# Patient Record
Sex: FEMALE
[De-identification: names, ages, dates, MRNs, and addresses within clinical notes are randomized per-mention and may not be internally consistent; named-entity substitution may affect disease eponyms.]

---

## 2018-08-31 ENCOUNTER — HOSPITAL ENCOUNTER (EMERGENCY)
Dept: HOSPITAL 75 - ER | Age: 28
Discharge: OTHER INSTITUTION | End: 2018-08-31
Payer: SELF-PAY

## 2018-08-31 VITALS — BODY MASS INDEX: 12.93 KG/M2 | WEIGHT: 73 LBS | HEIGHT: 63 IN

## 2018-08-31 VITALS — DIASTOLIC BLOOD PRESSURE: 74 MMHG | SYSTOLIC BLOOD PRESSURE: 107 MMHG

## 2018-08-31 DIAGNOSIS — Z91.14: ICD-10-CM

## 2018-08-31 DIAGNOSIS — E41: ICD-10-CM

## 2018-08-31 DIAGNOSIS — E11.65: Primary | ICD-10-CM

## 2018-08-31 LAB
ALBUMIN SERPL-MCNC: 4.5 GM/DL (ref 3.2–4.5)
ALP SERPL-CCNC: 82 U/L (ref 40–136)
ALT SERPL-CCNC: 11 U/L (ref 0–55)
APTT PPP: YELLOW S
BACTERIA #/AREA URNS HPF: (no result) /HPF
BASOPHILS # BLD AUTO: 0.1 10^3/UL (ref 0–0.1)
BASOPHILS NFR BLD AUTO: 1 % (ref 0–10)
BILIRUB SERPL-MCNC: 0.6 MG/DL (ref 0.1–1)
BILIRUB UR QL STRIP: NEGATIVE
BUN/CREAT SERPL: 15
CALCIUM SERPL-MCNC: 10 MG/DL (ref 8.5–10.1)
CHLORIDE SERPL-SCNC: 97 MMOL/L (ref 98–107)
CO2 SERPL-SCNC: 21 MMOL/L (ref 21–32)
CREAT SERPL-MCNC: 0.74 MG/DL (ref 0.6–1.3)
EOSINOPHIL # BLD AUTO: 0.1 10^3/UL (ref 0–0.3)
EOSINOPHIL NFR BLD AUTO: 1 % (ref 0–10)
ERYTHROCYTE [DISTWIDTH] IN BLOOD BY AUTOMATED COUNT: 12.8 % (ref 10–14.5)
FIBRINOGEN PPP-MCNC: CLEAR MG/DL
GFR SERPLBLD BASED ON 1.73 SQ M-ARVRAT: > 60 ML/MIN
GLUCOSE SERPL-MCNC: 360 MG/DL (ref 70–105)
GLUCOSE UR STRIP-MCNC: (no result) MG/DL
HCT VFR BLD CALC: 42 % (ref 35–52)
HGB BLD-MCNC: 15.2 G/DL (ref 11.5–16)
KETONES UR QL STRIP: (no result)
LEUKOCYTE ESTERASE UR QL STRIP: (no result)
LYMPHOCYTES # BLD AUTO: 2.8 X 10^3 (ref 1–4)
LYMPHOCYTES NFR BLD AUTO: 39 % (ref 12–44)
MANUAL DIFFERENTIAL PERFORMED BLD QL: NO
MCH RBC QN AUTO: 33 PG (ref 25–34)
MCHC RBC AUTO-ENTMCNC: 36 G/DL (ref 32–36)
MCV RBC AUTO: 90 FL (ref 80–99)
MONOCYTES # BLD AUTO: 0.6 X 10^3 (ref 0–1)
MONOCYTES NFR BLD AUTO: 9 % (ref 0–12)
NEUTROPHILS # BLD AUTO: 3.5 X 10^3 (ref 1.8–7.8)
NEUTROPHILS NFR BLD AUTO: 51 % (ref 42–75)
NITRITE UR QL STRIP: NEGATIVE
PH UR STRIP: 5 [PH] (ref 5–9)
PLATELET # BLD: 384 10^3/UL (ref 130–400)
PMV BLD AUTO: 11.2 FL (ref 7.4–10.4)
POTASSIUM SERPL-SCNC: 4.8 MMOL/L (ref 3.6–5)
PROT SERPL-MCNC: 7.9 GM/DL (ref 6.4–8.2)
PROT UR QL STRIP: NEGATIVE
RBC # BLD AUTO: 4.67 10^6/UL (ref 4.35–5.85)
RBC #/AREA URNS HPF: (no result) /HPF
SODIUM SERPL-SCNC: 136 MMOL/L (ref 135–145)
SP GR UR STRIP: 1.01 (ref 1.02–1.02)
SQUAMOUS #/AREA URNS HPF: (no result) /HPF
TSH SERPL DL<=0.05 MIU/L-ACNC: 1.74 UIU/ML (ref 0.35–4.94)
UROBILINOGEN UR-MCNC: NORMAL MG/DL
WBC # BLD AUTO: 7 10^3/UL (ref 4.3–11)
WBC #/AREA URNS HPF: (no result) /HPF

## 2018-08-31 PROCEDURE — 84703 CHORIONIC GONADOTROPIN ASSAY: CPT

## 2018-08-31 PROCEDURE — 84443 ASSAY THYROID STIM HORMONE: CPT

## 2018-08-31 PROCEDURE — 93005 ELECTROCARDIOGRAM TRACING: CPT

## 2018-08-31 PROCEDURE — 36415 COLL VENOUS BLD VENIPUNCTURE: CPT

## 2018-08-31 PROCEDURE — 80053 COMPREHEN METABOLIC PANEL: CPT

## 2018-08-31 PROCEDURE — 82962 GLUCOSE BLOOD TEST: CPT

## 2018-08-31 PROCEDURE — 81000 URINALYSIS NONAUTO W/SCOPE: CPT

## 2018-08-31 PROCEDURE — 96360 HYDRATION IV INFUSION INIT: CPT

## 2018-08-31 PROCEDURE — 85025 COMPLETE CBC W/AUTO DIFF WBC: CPT

## 2018-08-31 RX ADMIN — SODIUM CHLORIDE SCH MLS/HR: 900 INJECTION, SOLUTION INTRAVENOUS at 12:10

## 2018-08-31 NOTE — ED GENERAL
General


Stated Complaint:  BS ISSUES


Source of Information:  Patient


Exam Limitations:  No Limitations





History of Present Illness


Date Seen by Provider:  Aug 31, 2018


Time Seen by Provider:  11:56


Initial Comments


To ER per private vehicle with reports of high blood sugar and weight loss. The 

patient moved to the [facility] from what [facility] in 2014. She lived in 

[place] about a year ago at which point she weighed about 120 pounds. She 

was diagnosed with diabetes out there and started on insulin. Her family in 

[place] told her the insulin to kill her and she should quit taking it. She 

didn't has subsequently lost about 40 pounds. She is now 73 pounds. She was 

sent to ER from [facility] today with a hemoglobin A1c of greater 

than 14 and a glucose of 319. She has not used insulin in about 6 months. She 

denies nausea or vomiting.


Timing/Duration:  Getting Worse


Severity:  Moderate





Allergies and Home Medications


Allergies


Coded Allergies:  


     No Known Drug Allergies (Unverified , 8/31/18)





Home Medications


No Active Prescriptions or Reported Meds





Patient Home Medication List


Home Medication List Reviewed:  Yes





Review of Systems


Review of Systems


Constitutional:  see HPI


EENTM:  see HPI


Respiratory:  no symptoms reported


Cardiovascular:  no symptoms reported


Genitourinary:  no symptoms reported


Musculoskeletal:  no symptoms reported


Skin:  no symptoms reported


Hematologic/Lymphatic:  No Symptoms Reported





Past Medical-Social-Family Hx


Patient Social History


Recent Foreign Travel:  No


Contact w/Someone Who Travel:  No





Physical Exam


Vital Signs





Vital Signs - First Documented








 8/31/18





 11:40


 


Temp 97.5


 


Pulse 104


 


Resp 13


 


B/P (MAP) 95/72 (80)


 


Pulse Ox 99





Capillary Refill :


Height, Weight, BMI


Height: '"


Weight: lbs. oz. kg;  BMI


Method:


General Appearance:  No Apparent Distress, WD/WN, Other (emaciated)


Eyes:  Bilateral Eye Normal Inspection, Bilateral Eye PERRL


HEENT:  PERRL/EOMI, TMs Normal


Neck:  Full Range of Motion, Normal Inspection


Respiratory:  Normal Breath Sounds, No Accessory Muscle Use, No Respiratory 

Distress


Cardiovascular:  Regular Rate, Rhythm, Normal Peripheral Pulses, Other


Gastrointestinal:  Normal Bowel Sounds, Non Tender, Soft


Extremity:  Normal Capillary Refill, Normal Inspection


Neurologic/Psychiatric:  Alert, Oriented x3


Skin:  Normal Color, Warm/Dry





Progress/Results/Core Measures


Suspected Sepsis


SIRS


Temperature: 


Pulse:  


Respiratory Rate: 


 


Laboratory Tests


8/31/18 11:50: White Blood Count 7.0


Blood Pressure  / 


Mean: 


 





Laboratory Tests


8/31/18 11:50: 


Creatinine 0.74, Platelet Count 384, Total Bilirubin 0.6








Results/Orders


Lab Results





Laboratory Tests








Test


 8/31/18


11:50 8/31/18


13:00 Range/Units


 


 


White Blood Count


 7.0 


 


 4.3-11.0


10^3/uL


 


Red Blood Count


 4.67 


 


 4.35-5.85


10^6/uL


 


Hemoglobin 15.2   11.5-16.0  G/DL


 


Hematocrit 42   35-52  %


 


Mean Corpuscular Volume 90   80-99  FL


 


Mean Corpuscular Hemoglobin 33   25-34  PG


 


Mean Corpuscular Hemoglobin


Concent 36 


 


 32-36  G/DL





 


Red Cell Distribution Width 12.8   10.0-14.5  %


 


Platelet Count


 384 


 


 130-400


10^3/uL


 


Mean Platelet Volume 11.2 H  7.4-10.4  FL


 


Neutrophils (%) (Auto) 51   42-75  %


 


Lymphocytes (%) (Auto) 39   12-44  %


 


Monocytes (%) (Auto) 9   0-12  %


 


Eosinophils (%) (Auto) 1   0-10  %


 


Basophils (%) (Auto) 1   0-10  %


 


Neutrophils # (Auto) 3.5   1.8-7.8  X 10^3


 


Lymphocytes # (Auto) 2.8   1.0-4.0  X 10^3


 


Monocytes # (Auto) 0.6   0.0-1.0  X 10^3


 


Eosinophils # (Auto)


 0.1 


 


 0.0-0.3


10^3/uL


 


Basophils # (Auto)


 0.1 


 


 0.0-0.1


10^3/uL


 


Sodium Level 136   135-145  MMOL/L


 


Potassium Level 4.8   3.6-5.0  MMOL/L


 


Chloride Level 97 L    MMOL/L


 


Carbon Dioxide Level 21   21-32  MMOL/L


 


Anion Gap 18 H  5-14  MMOL/L


 


Blood Urea Nitrogen 11   7-18  MG/DL


 


Creatinine


 0.74 


 


 0.60-1.30


MG/DL


 


Estimat Glomerular Filtration


Rate > 60 


 


  





 


BUN/Creatinine Ratio 15    


 


Glucose Level 360 H    MG/DL


 


Calcium Level 10.0   8.5-10.1  MG/DL


 


Corrected Calcium 9.6   8.5-10.1  MG/DL


 


Total Bilirubin 0.6   0.1-1.0  MG/DL


 


Aspartate Amino Transf


(AST/SGOT) 19 


 


 5-34  U/L





 


Alanine Aminotransferase


(ALT/SGPT) 11 


 


 0-55  U/L





 


Alkaline Phosphatase 82     U/L


 


Total Protein 7.9   6.4-8.2  GM/DL


 


Albumin 4.5   3.2-4.5  GM/DL


 


TSH Cascade Testing


 1.74 


 


 0.35-4.94


UIU/ML


 


Urine Color  YELLOW   


 


Urine Clarity  CLEAR   


 


Urine pH  5  5-9  


 


Urine Specific Gravity  1.015 L 1.016-1.022  


 


Urine Protein  NEGATIVE  NEGATIVE  


 


Urine Glucose (UA)  4+ H NEGATIVE  


 


Urine Ketones  4+ H NEGATIVE  


 


Urine Nitrite  NEGATIVE  NEGATIVE  


 


Urine Bilirubin  NEGATIVE  NEGATIVE  


 


Urine Urobilinogen  NORMAL  NORMAL  MG/DL


 


Urine Leukocyte Esterase  1+ H NEGATIVE  


 


Urine RBC (Auto)  NEGATIVE  NEGATIVE  


 


Urine RBC  NONE   /HPF


 


Urine WBC  0-2   /HPF


 


Urine Squamous Epithelial


Cells 


 RARE 


  /HPF





 


Urine Crystals  NONE   /LPF


 


Urine Bacteria  MODERATE H  /HPF


 


Urine Casts  NONE   /LPF


 


Urine Mucus  NEGATIVE   /LPF


 


Urine Culture Indicated  NO   








My Orders





Orders - TERRY GRIMALDO


Ns Iv 1000 Ml (Sodium Chloride 0.9%) (8/31/18 12:00)


Comprehensive Metabolic Panel (8/31/18 11:51)


Cbc With Automated Diff (8/31/18 11:51)


Thyroid Analyzer (8/31/18 11:51)


Ua Culture If Indicated (8/31/18 11:51)


Ekg Tracing (8/31/18 11:51)


Continuous Ekg Monitoring (8/31/18 11:51)


Iv Heplock-Insert (Order) (8/31/18 11:51)


Urine Pregnancy Bedside (8/31/18 11:51)





Vital Signs/I&O











 8/31/18





 11:40


 


Temp 97.5


 


Pulse 104


 


Resp 13


 


B/P (MAP) 95/72 (80)


 


Pulse Ox 99





Capillary Refill :





Departure


Communication (Admissions)


1245-her labs are surprisingly normal looking. She is receiving a 1 L bolus of 

IV fluids, her CO2 is normal, she is not tachypneic has no nausea or vomiting. 

She does not appear to be in diabetic ketoacidosis. Her sister states that she 

has been eating and drinking normally, lots of water every day. She does not 

necessarily meet any admission criteria at this time. I did call SHRAVAN Thayer 

at [facility]. We will discharge the patient from the emergency 

room back to [facility] where they will prescribe insulin and do insulin 

administration teaching.





Impression





 Primary Impression:  


 Uncontrolled diabetes mellitus


 Additional Impression:  


 Emaciated


Disposition:  09 ADMITTED AS INPATIENT


Condition:  Stable





Departure-Patient Inst.


Decision time for Depature:  12:46


Referrals:  


[facility] (PCP/Family)


Primary Care Physician


Patient Instructions:  Diabetes Type 1, Adult (DC)





Add. Discharge Instructions:  


1. It is most important that she take your insulin as directed. Follow-up with 

[facility]. Once you're discharged from here go directly back to 

[facility] where they will prescribe insulin and teach you how to use it. 

This is very important.


Scripts


No Active Prescriptions or Reported Meds





Copy


Copies To 1:   VIPIN FRANKS PETER J APRN Aug 31, 2018 12:00